# Patient Record
Sex: MALE | Race: BLACK OR AFRICAN AMERICAN | NOT HISPANIC OR LATINO | URBAN - METROPOLITAN AREA
[De-identification: names, ages, dates, MRNs, and addresses within clinical notes are randomized per-mention and may not be internally consistent; named-entity substitution may affect disease eponyms.]

---

## 2017-01-01 ENCOUNTER — OFFICE (OUTPATIENT)
Dept: URBAN - METROPOLITAN AREA CLINIC 11 | Facility: CLINIC | Age: 67
End: 2017-01-01

## 2017-01-01 ENCOUNTER — INPATIENT HOSPITAL (OUTPATIENT)
Dept: URBAN - METROPOLITAN AREA HOSPITAL 81 | Facility: HOSPITAL | Age: 67
End: 2017-01-01
Payer: MEDICARE

## 2017-01-01 VITALS
DIASTOLIC BLOOD PRESSURE: 42 MMHG | WEIGHT: 173 LBS | HEIGHT: 71 IN | HEART RATE: 45 BPM | RESPIRATION RATE: 16 BRPM | SYSTOLIC BLOOD PRESSURE: 111 MMHG

## 2017-01-01 DIAGNOSIS — R94.5 ABNORMAL RESULTS OF LIVER FUNCTION STUDIES: ICD-10-CM

## 2017-01-01 DIAGNOSIS — B96.81 HELICOBACTER PYLORI [H. PYLORI] AS THE CAUSE OF DISEASES CLA: ICD-10-CM

## 2017-01-01 DIAGNOSIS — K22.70 BARRETT'S ESOPHAGUS WITHOUT DYSPLASIA: ICD-10-CM

## 2017-01-01 DIAGNOSIS — Z79.01 LONG TERM (CURRENT) USE OF ANTICOAGULANTS: ICD-10-CM

## 2017-01-01 DIAGNOSIS — K21.9 GASTRO-ESOPHAGEAL REFLUX DISEASE WITHOUT ESOPHAGITIS: ICD-10-CM

## 2017-01-01 DIAGNOSIS — E11.9 TYPE 2 DIABETES MELLITUS WITHOUT COMPLICATIONS: ICD-10-CM

## 2017-01-01 DIAGNOSIS — K22.10 ULCER OF ESOPHAGUS WITHOUT BLEEDING: ICD-10-CM

## 2017-01-01 DIAGNOSIS — I10 ESSENTIAL (PRIMARY) HYPERTENSION: ICD-10-CM

## 2017-01-01 DIAGNOSIS — I50.9 HEART FAILURE, UNSPECIFIED: ICD-10-CM

## 2017-01-01 DIAGNOSIS — I25.10 ATHEROSCLEROTIC HEART DISEASE OF NATIVE CORONARY ARTERY WITH: ICD-10-CM

## 2017-01-01 PROCEDURE — 99222 1ST HOSP IP/OBS MODERATE 55: CPT | Performed by: INTERNAL MEDICINE

## 2017-01-01 PROCEDURE — 99232 SBSQ HOSP IP/OBS MODERATE 35: CPT | Performed by: INTERNAL MEDICINE

## 2017-01-01 PROCEDURE — 99213 OFFICE O/P EST LOW 20 MIN: CPT | Performed by: INTERNAL MEDICINE

## 2017-01-01 RX ORDER — PANTOPRAZOLE SODIUM 20 MG/1
20 TABLET, DELAYED RELEASE ORAL
Qty: 30 | Refills: 11 | Status: ACTIVE
Start: 2017-01-01

## 2017-02-13 NOTE — SERVICENOTES
Overall the patient's reflux symptoms are doing much better.  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  I did have a long discussion with the patient regarding the possible long-term effects of PPI use.  Unfortunately, given the patient's Miller's esophagus I do believe the risks of PPI use are outweighed by the benefits as Miller's is more likely to progress if he does not have adequate acid suppression and H2 blocker is likely not going to be good enough for this.  Because of this I did recommend that he at least be on a low dose PPI.  Otherwise, the patient's next colonoscopy is due next year and his next EGD for surveillance of Miller's is due in two years.

## 2017-02-13 NOTE — SERVICEHPINOTES
Mr. Weaver is a 66-year-old -American man here for follow-up of heartburn and bloating. The patient was initially seen by me in November 2014 and at that time blood work, including celiac labs were basically negative. CT scan was also normal. EGD was performed that revealed Miller's esophagus and mild gastritis. Biopsies confirmed Miller's esophagus without dysplasia and stomach biopsies were positive for H. pylori. He underwent treatment of H. pylori with triple therapy and had eradication confirmed with H. pylori breath test. He has done very well since then. He did have a colonoscopy in April 2015 with removal of several adenomatous polyps. Because of this his next colonoscopy is due in three years. He underwent repeat EGD in February 2016 for Miller's esophagus where there was a 1 cm segment found with biopsies negative for dysplasia and fish brushings also negative. His next EGD is due in three years. He now comes back in today for follow-up. Overall he states that he is doing well. He hospital a few months ago to breath and increased sodium but states he is now better. His reflux is also better. His kidney  did switch him from a PPI to an H2 blocker. He denies any abdominal pain, fevers, chills, nausea, vomiting, diarrhea, or rectal bleeding.BR